# Patient Record
(demographics unavailable — no encounter records)

---

## 2025-03-14 NOTE — PHYSICAL EXAM
[de-identified] :  General: No acute distress, conversant, well-nourished. Head: Normocephalic, atraumatic Neck: trachea midline, FROM Heart: normotensive and normal rate and rhythm Lungs: No labored breathing Skin: No abrasions, no rashes, no edema Psych: Alert and oriented to person, place and time Extremities: no peripheral edema or digital cyanosis Gait: Normal gait. Can perform tandem gait.  Vascular: warm and well perfused distally, palpable distal pulses   MSK: Spine: + tenderness to palpation.  No step-off, no deformity.   NEURO: Sensation          Left           C5     2/2               C6     2/2               C7     2/2               C8     2/2              T1     2/2                        Right         C5     2/2               C6     2/2               C7     2/2               C8     2/2              T1     2/2        Motor:                                                Left             C5 (deltoid abduction)             5/5               C6 (biceps flexion)                   5/5                C7 (triceps extension)             5/5               C8 (finger flexion)                     5/5               T1 (interosseous)                     5/5                                                            Right           C5 (deltoid abduction)             5/5               C6 (biceps flexion)                   5/5                C7 (triceps extension)             5/5               C8 (finger flexion)                     5/5               T1 (interosseous)                     5/5                       Sensation Left L2  -  2/2            Left L3  -  2/2 Left L4  -  2/2 Left L5  -  2/2 Left S1  -  2/2   Right L2  -  2/2            Right L3  -  2/2 Right L4  -  2/2 Right L5  -  2/2 Right S1  -  2/2   Motor: Left L2 (hip flexion)                            5/5                Left L3 (knee extension)                   5/5                Left L4 (ankle dorsiflexion)                 5/5                Left L5 (long toe extensor)                5/5                Left S1 (ankle plantar flexion)           5/5   Right L2 (hip flexion)                            5/5                Right L3 (knee extension)                   5/5                Right L4 (ankle dorsiflexion)                 5/5                Right L5 (long toe extensor)                5/5                Right S1 (ankle plantar flexion)           5/5   Reflexes: Normal and symmetric Negative Spurlings test.  Negative Hoffmans reflex.  Negative clonus.  Down-going Babinski.  Knee with no erythema, no effusion.  + tenderness to palpation of knee. No medial joint line tenderness. No lateral joint line tenderness.   Range of motion: 0 - 130 degrees + pain with range of motion.   Negative Godfrey's test. Stable to varus and valgus stress. Negative Lachman's test, negative anterior and posterior drawer tests.    Sensation intact to light touch.  Normal motor exam. Warm and well perfused distally.   [de-identified] : Patient refused imaging today.

## 2025-03-14 NOTE — HISTORY OF PRESENT ILLNESS
[de-identified] : 68 y/o female presenting with upper and lower back pain x 3-4 months. She also complains of bilateral knee pain that began after EMG/NCS. She is unsure why she had a nerve test. She states the pain is 10/10. She's tried OTC pain medications without relief.  denies radicular pain, recent illness, fevers, numbness, weakness, balance problems, saddle anesthesia, urinary retention or fecal incontinence.

## 2025-03-14 NOTE — ASSESSMENT
[FreeTextEntry1] : 66 y/o female presenting with upper and lower back pain x 3-4 months. She also complains of bilateral knee pain that began after EMG/NCS. She is unsure why she had a nerve test. She states the pain is 10/10. She's tried OTC pain medications without relief.  denies radicular pain, recent illness, fevers, numbness, weakness, balance problems, saddle anesthesia, urinary retention or fecal incontinence.  Patient will be sent for a MRI T/L spine. Start diclofenac PRN. F/U after MRIs. We discussed red flag symptoms that would require emergent evaluation. She knows to call with any questions or concerns or if her symptoms acutely worsen.

## 2025-03-14 NOTE — HISTORY OF PRESENT ILLNESS
[de-identified] : 68 y/o female presenting with upper and lower back pain x 3-4 months. She also complains of bilateral knee pain that began after EMG/NCS. She is unsure why she had a nerve test. She states the pain is 10/10. She's tried OTC pain medications without relief.  denies radicular pain, recent illness, fevers, numbness, weakness, balance problems, saddle anesthesia, urinary retention or fecal incontinence.

## 2025-03-14 NOTE — PHYSICAL EXAM
[de-identified] :  General: No acute distress, conversant, well-nourished. Head: Normocephalic, atraumatic Neck: trachea midline, FROM Heart: normotensive and normal rate and rhythm Lungs: No labored breathing Skin: No abrasions, no rashes, no edema Psych: Alert and oriented to person, place and time Extremities: no peripheral edema or digital cyanosis Gait: Normal gait. Can perform tandem gait.  Vascular: warm and well perfused distally, palpable distal pulses   MSK: Spine: + tenderness to palpation.  No step-off, no deformity.   NEURO: Sensation          Left           C5     2/2               C6     2/2               C7     2/2               C8     2/2              T1     2/2                        Right         C5     2/2               C6     2/2               C7     2/2               C8     2/2              T1     2/2        Motor:                                                Left             C5 (deltoid abduction)             5/5               C6 (biceps flexion)                   5/5                C7 (triceps extension)             5/5               C8 (finger flexion)                     5/5               T1 (interosseous)                     5/5                                                            Right           C5 (deltoid abduction)             5/5               C6 (biceps flexion)                   5/5                C7 (triceps extension)             5/5               C8 (finger flexion)                     5/5               T1 (interosseous)                     5/5                       Sensation Left L2  -  2/2            Left L3  -  2/2 Left L4  -  2/2 Left L5  -  2/2 Left S1  -  2/2   Right L2  -  2/2            Right L3  -  2/2 Right L4  -  2/2 Right L5  -  2/2 Right S1  -  2/2   Motor: Left L2 (hip flexion)                            5/5                Left L3 (knee extension)                   5/5                Left L4 (ankle dorsiflexion)                 5/5                Left L5 (long toe extensor)                5/5                Left S1 (ankle plantar flexion)           5/5   Right L2 (hip flexion)                            5/5                Right L3 (knee extension)                   5/5                Right L4 (ankle dorsiflexion)                 5/5                Right L5 (long toe extensor)                5/5                Right S1 (ankle plantar flexion)           5/5   Reflexes: Normal and symmetric Negative Spurlings test.  Negative Hoffmans reflex.  Negative clonus.  Down-going Babinski.  Knee with no erythema, no effusion.  + tenderness to palpation of knee. No medial joint line tenderness. No lateral joint line tenderness.   Range of motion: 0 - 130 degrees + pain with range of motion.   Negative Godfrey's test. Stable to varus and valgus stress. Negative Lachman's test, negative anterior and posterior drawer tests.    Sensation intact to light touch.  Normal motor exam. Warm and well perfused distally.   [de-identified] : Patient refused imaging today.

## 2025-03-14 NOTE — ASSESSMENT
[FreeTextEntry1] : 68 y/o female presenting with upper and lower back pain x 3-4 months. She also complains of bilateral knee pain that began after EMG/NCS. She is unsure why she had a nerve test. She states the pain is 10/10. She's tried OTC pain medications without relief.  denies radicular pain, recent illness, fevers, numbness, weakness, balance problems, saddle anesthesia, urinary retention or fecal incontinence.  Patient will be sent for a MRI T/L spine. Start diclofenac PRN. F/U after MRIs. We discussed red flag symptoms that would require emergent evaluation. She knows to call with any questions or concerns or if her symptoms acutely worsen.

## 2025-03-14 NOTE — HISTORY OF PRESENT ILLNESS
[de-identified] : 66 y/o female presenting with upper and lower back pain x 3-4 months. She also complains of bilateral knee pain that began after EMG/NCS. She is unsure why she had a nerve test. She states the pain is 10/10. She's tried OTC pain medications without relief.  denies radicular pain, recent illness, fevers, numbness, weakness, balance problems, saddle anesthesia, urinary retention or fecal incontinence.

## 2025-03-14 NOTE — PHYSICAL EXAM
[de-identified] :  General: No acute distress, conversant, well-nourished. Head: Normocephalic, atraumatic Neck: trachea midline, FROM Heart: normotensive and normal rate and rhythm Lungs: No labored breathing Skin: No abrasions, no rashes, no edema Psych: Alert and oriented to person, place and time Extremities: no peripheral edema or digital cyanosis Gait: Normal gait. Can perform tandem gait.  Vascular: warm and well perfused distally, palpable distal pulses   MSK: Spine: + tenderness to palpation.  No step-off, no deformity.   NEURO: Sensation          Left           C5     2/2               C6     2/2               C7     2/2               C8     2/2              T1     2/2                        Right         C5     2/2               C6     2/2               C7     2/2               C8     2/2              T1     2/2        Motor:                                                Left             C5 (deltoid abduction)             5/5               C6 (biceps flexion)                   5/5                C7 (triceps extension)             5/5               C8 (finger flexion)                     5/5               T1 (interosseous)                     5/5                                                            Right           C5 (deltoid abduction)             5/5               C6 (biceps flexion)                   5/5                C7 (triceps extension)             5/5               C8 (finger flexion)                     5/5               T1 (interosseous)                     5/5                       Sensation Left L2  -  2/2            Left L3  -  2/2 Left L4  -  2/2 Left L5  -  2/2 Left S1  -  2/2   Right L2  -  2/2            Right L3  -  2/2 Right L4  -  2/2 Right L5  -  2/2 Right S1  -  2/2   Motor: Left L2 (hip flexion)                            5/5                Left L3 (knee extension)                   5/5                Left L4 (ankle dorsiflexion)                 5/5                Left L5 (long toe extensor)                5/5                Left S1 (ankle plantar flexion)           5/5   Right L2 (hip flexion)                            5/5                Right L3 (knee extension)                   5/5                Right L4 (ankle dorsiflexion)                 5/5                Right L5 (long toe extensor)                5/5                Right S1 (ankle plantar flexion)           5/5   Reflexes: Normal and symmetric Negative Spurlings test.  Negative Hoffmans reflex.  Negative clonus.  Down-going Babinski.  Knee with no erythema, no effusion.  + tenderness to palpation of knee. No medial joint line tenderness. No lateral joint line tenderness.   Range of motion: 0 - 130 degrees + pain with range of motion.   Negative Godfrey's test. Stable to varus and valgus stress. Negative Lachman's test, negative anterior and posterior drawer tests.    Sensation intact to light touch.  Normal motor exam. Warm and well perfused distally.   [de-identified] : Patient refused imaging today.

## 2025-04-01 NOTE — ASSESSMENT
[FreeTextEntry1] : Discussed at length with patient underlying arthritis in severity at this time patient elects home exercise and observation ultimately patient is aware if no improvement she may require total knee replacement

## 2025-04-01 NOTE — HISTORY OF PRESENT ILLNESS
[de-identified] : Initial visit: Bilateral knee pain  - Right worse than Left  Reason: No injury  Duration: 4 months  Prior studies:  No x rays - Thyroid surgery / MRI only Symptoms: sharp  Aggravating Fx: Walking / up and down stairs  Alleviating Fx:  Pain level:  810 Pain medication: none Medical Hx: Surgical Hx: Thyroid  - 10 years ago  Current Medication: Allergies: Penicillin

## 2025-04-01 NOTE — HISTORY OF PRESENT ILLNESS
[de-identified] : Initial visit: Bilateral knee pain  - Right worse than Left  Reason: No injury  Duration: 4 months  Prior studies:  No x rays - Thyroid surgery / MRI only Symptoms: sharp  Aggravating Fx: Walking / up and down stairs  Alleviating Fx:  Pain level:  810 Pain medication: none Medical Hx: Surgical Hx: Thyroid  - 10 years ago  Current Medication: Allergies: Penicillin

## 2025-04-01 NOTE — PHYSICAL EXAM
[de-identified] : Bilateral knee  Constitutional:  The patient is healthy-appearing and in no apparent distress.   Gait: The patient ambulates with a normal gait and no limp.  Cardiovascular System:  The capillary refill is less than 2 seconds.   Skin:  There are no skin abnormalities.  Right Knee:   Bony Palpation:  There is tenderness of the medial joint line.  There is no tenderness of the lateral joint line. There is tenderness of the medial femoral chondyle. There is no tenderness of the lateral femoral chondyle. There is no tenderness of the tibial tubercle. There is no tenderness of the superior patella. There is no tenderness of the inferior patella. There is tenderness of the medial patellar facet. There is tenderness of the lateral patellar facet.  Soft Tissue Palpation:  There is no tenderness of the medial retinaculum. There is no tenderness of the lateral retinaculum. There is no tenderness of the quadriceps tendon. There is no tenderness of the patella tendon. There is no tenderness of the ITB. There is no tenderness of the pes anserine.  Active Range of Motion:  The range of motion at the knee actively and passively is full.   Special Tests:  There is a negative Apley. There is a negative Steinmanns.  There is a negative Lachman and Anterior Drawer. There is a negative Posterior Drawer.   There is no varus or valgus laxity.  Strength:  There is 5/5 hip flexion and 5/5 knee flexion and extension.    Left Knee:   Bony Palpation:  There is tenderness of the medial joint line.  There is no tenderness of the lateral joint line. There is tenderness of the medial femoral chondyle. There is no tenderness of the lateral femoral chondyle. There is no tenderness of the tibial tubercle. There is no tenderness of the superior patella. There is no tenderness of the inferior patella. There is tenderness of the medial patellar facet. There is tenderness of the lateral patellar facet.  Soft Tissue Palpation:  There is no tenderness of the medial retinaculum. There is no tenderness of the lateral retinaculum. There is no tenderness of the quadriceps tendon. There is no tenderness of the patella tendon. There is no tenderness of the ITB. There is no tenderness of the pes anserine.  Active Range of Motion:  The range of motion at the knee actively and passively is full.   Special Tests:  There is a negative Apley. There is a negative Steinmanns.  There is a negative Lachman and Anterior Drawer. There is a negative Posterior Drawer.   There is no varus or valgus laxity.  Strength:  There is 5/5 hip flexion and 5/5 knee flexion and extension.    Psychiatric:  The patient demonstrates a normal mood and affect and is active and alert  [de-identified] : X-ray bilateral knee from 4 months ago per patient [place]: I reviewed these images and there is moderate medial and patellofemoral arthritis

## 2025-04-03 NOTE — PHYSICAL EXAM
[de-identified] : Bilateral hip  Constitutional:  The patient is healthy-appearing and in no apparent distress.   Gait: The patient ambulates with a normal gait and no limp.  Cardiovascular System:  There is capillary refill less than 2 seconds.   Skin:  There is no skin abnormalities.  Bilateral hip  Bony Palpation:  There is no tenderness of the iliac crest. There is no tenderness of the ASIS. There is no tenderness of the PSIS. There is no tenderness of the SI joint. There is tenderness of the greater trochanter.   Soft Tissue Palpation:  There is no tenderness of the hip adductors. There is no tenderness of the hip abductors. There is no tenderness of the hamstrings.  There is no tenderness of the piriformis.  There is no tenderness of the hip flexors.  Active Range of Motion:  There is full range of motion at the hip actively and passively.   Special Tests:  There is a positive Sara's test. There is a negative Hemanth-Yony test.   Strength:  There is 5/5 hip flexion, adduction, abduction.    Psychiatric:  The patient demonstrates a normal mood and affect and is active and alert. [de-identified] : Based on detailed discussion with history and physical examination of the patient, x-ray evaluation is recommended and ordered for treatment and diagnosis. X-ray bilateral hip 3 view: There is no significant bony / soft tissue abnormality, arthritis, or fracture.

## 2025-04-03 NOTE — HISTORY OF PRESENT ILLNESS
[de-identified] : New issue Bilateral hip  Prior studies: x rays  Symptoms:  radiating pain from back to buttocks  Pain Level:  unsure  Physical therapy: none

## 2025-04-03 NOTE — ASSESSMENT
[FreeTextEntry1] : Discussed at length with patient exam history and imaging consistent with greater trochanteric bursitis she elects home exercise PT also given home exercises for plantar fasciitis she will follow-up with Dr. Porter in regards to her MRIs of her lumbar spine and thoracic spine which reveal spondylolisthesis as well as arthritis

## 2025-04-09 NOTE — ASSESSMENT
[FreeTextEntry1] : 68 y/o female presenting with upper and lower back pain x 3-4 months. She also complains of bilateral knee pain that began after EMG/NCS. She is unsure why she had a nerve test. She states the pain is 10/10. She's tried OTC pain medications without relief. denies radicular pain, recent illness, fevers, numbness, weakness, balance problems, saddle anesthesia, urinary retention or fecal incontinence. Since previous visit, patient continues to have symptoms. She is here to review her lumbar and thoracic MRIs. I independently reviewed thoracic and lumbar MRIs which show degenerative changes without compression of neural elements. We discussed treatment options including PT, medications, and injections. The patient was given a referral for physical therapy. Diclofenac and/or tylenol PRN. The patient was given a referral for consideration for spinal injections. F/U in 6-8 weeks. We discussed red flag symptoms that would require emergent evaluation. She knows to call with any questions or concerns or if her symptoms acutely worsen.

## 2025-04-09 NOTE — DISCUSSION/SUMMARY
[de-identified] :  I, Dr. Porter personally performed the evaluation and management services for this established patient who presents today with (a) new problem(s)/exacerbation of (an) existing condition(s). That E/M includes conducting the clinically appropriate interval history &/or exam, assessing all new/exacerbated conditions, and establishing a new plan of care. Today, my MESERET, Melody Astudillo was here to observe my evaluation and management service for this new problem/exacerbated condition and follow the plan of care established by me going forward.

## 2025-04-09 NOTE — DISCUSSION/SUMMARY
[de-identified] :  I, Dr. Porter personally performed the evaluation and management services for this established patient who presents today with (a) new problem(s)/exacerbation of (an) existing condition(s). That E/M includes conducting the clinically appropriate interval history &/or exam, assessing all new/exacerbated conditions, and establishing a new plan of care. Today, my MESERET, Melody Astudillo was here to observe my evaluation and management service for this new problem/exacerbated condition and follow the plan of care established by me going forward.

## 2025-04-09 NOTE — PHYSICAL EXAM
[de-identified] : General: No acute distress, conversant, well-nourished. Head: Normocephalic, atraumatic Neck: trachea midline, FROM Heart: normotensive and normal rate and rhythm Lungs: No labored breathing Skin: No abrasions, no rashes, no edema Psych: Alert and oriented to person, place and time Extremities: no peripheral edema or digital cyanosis Gait: Normal gait. Can perform tandem gait. Vascular: warm and well perfused distally, palpable distal pulses  MSK: Spine: + tenderness to palpation. No step-off, no deformity.  NEURO: Sensation  Left  C5 2/2  C6 2/2  C7 2/2  C8 2/2  T1 2/2    Right C5 2/2  C6 2/2  C7 2/2  C8 2/2  T1 2/2  Motor:       Left  C5 (deltoid abduction)  5/5  C6 (biceps flexion)   5/5  C7 (triceps extension)  5/5  C8 (finger flexion)   5/5  T1 (interosseous)   5/5         Right  C5 (deltoid abduction)  5/5  C6 (biceps flexion)   5/5  C7 (triceps extension)  5/5  C8 (finger flexion)   5/5  T1 (interosseous)   5/5    Sensation Left L2 - 2/2  Left L3 - 2/2 Left L4 - 2/2 Left L5 - 2/2 Left S1 - 2/2  Right L2 - 2/2  Right L3 - 2/2 Right L4 - 2/2 Right L5 - 2/2 Right S1 - 2/2  Motor: Left L2 (hip flexion)    5/5  Left L3 (knee extension)   5/5  Left L4 (ankle dorsiflexion)   5/5  Left L5 (long toe extensor)  5/5  Left S1 (ankle plantar flexion)  5/5  Right L2 (hip flexion)    5/5  Right L3 (knee extension)   5/5  Right L4 (ankle dorsiflexion)   5/5  Right L5 (long toe extensor)  5/5  Right S1 (ankle plantar flexion)  5/5  Reflexes: Normal and symmetric Negative Spurlings test. Negative Hoffmans reflex. Negative clonus. Down-going Babinski. [de-identified] : I independently reviewed thoracic and lumbar MRIs which show degenerative changes without compression of neural elements.   MRI of the lumbar spine  03/28/2025  Multilevel degenerative changes of the lumbar spine, as detailed.   MRI of the thoracic spine 03/28/2025  Multilevel degenerative changes, as detailed.  Partially imaged right thyroid nodule(s). Suggest follow-up thyroid ultrasound, as clinically warranted.

## 2025-04-09 NOTE — DISCUSSION/SUMMARY
[de-identified] :  I, Dr. Porter personally performed the evaluation and management services for this established patient who presents today with (a) new problem(s)/exacerbation of (an) existing condition(s). That E/M includes conducting the clinically appropriate interval history &/or exam, assessing all new/exacerbated conditions, and establishing a new plan of care. Today, my MESERET, Melody Astudillo was here to observe my evaluation and management service for this new problem/exacerbated condition and follow the plan of care established by me going forward.

## 2025-04-09 NOTE — HISTORY OF PRESENT ILLNESS
[de-identified] : 68 y/o female presenting with upper and lower back pain x 3-4 months. She also complains of bilateral knee pain that began after EMG/NCS. She is unsure why she had a nerve test. She states the pain is 10/10. She's tried OTC pain medications without relief. denies radicular pain, recent illness, fevers, numbness, weakness, balance problems, saddle anesthesia, urinary retention or fecal incontinence. Since previous visit, patient continues to have symptoms. She is here to review her lumbar and thoracic MRIs.

## 2025-04-09 NOTE — HISTORY OF PRESENT ILLNESS
[de-identified] : 68 y/o female presenting with upper and lower back pain x 3-4 months. She also complains of bilateral knee pain that began after EMG/NCS. She is unsure why she had a nerve test. She states the pain is 10/10. She's tried OTC pain medications without relief. denies radicular pain, recent illness, fevers, numbness, weakness, balance problems, saddle anesthesia, urinary retention or fecal incontinence. Since previous visit, patient continues to have symptoms. She is here to review her lumbar and thoracic MRIs.

## 2025-04-09 NOTE — PHYSICAL EXAM
[de-identified] : General: No acute distress, conversant, well-nourished. Head: Normocephalic, atraumatic Neck: trachea midline, FROM Heart: normotensive and normal rate and rhythm Lungs: No labored breathing Skin: No abrasions, no rashes, no edema Psych: Alert and oriented to person, place and time Extremities: no peripheral edema or digital cyanosis Gait: Normal gait. Can perform tandem gait. Vascular: warm and well perfused distally, palpable distal pulses  MSK: Spine: + tenderness to palpation. No step-off, no deformity.  NEURO: Sensation  Left  C5 2/2  C6 2/2  C7 2/2  C8 2/2  T1 2/2    Right C5 2/2  C6 2/2  C7 2/2  C8 2/2  T1 2/2  Motor:       Left  C5 (deltoid abduction)  5/5  C6 (biceps flexion)   5/5  C7 (triceps extension)  5/5  C8 (finger flexion)   5/5  T1 (interosseous)   5/5         Right  C5 (deltoid abduction)  5/5  C6 (biceps flexion)   5/5  C7 (triceps extension)  5/5  C8 (finger flexion)   5/5  T1 (interosseous)   5/5    Sensation Left L2 - 2/2  Left L3 - 2/2 Left L4 - 2/2 Left L5 - 2/2 Left S1 - 2/2  Right L2 - 2/2  Right L3 - 2/2 Right L4 - 2/2 Right L5 - 2/2 Right S1 - 2/2  Motor: Left L2 (hip flexion)    5/5  Left L3 (knee extension)   5/5  Left L4 (ankle dorsiflexion)   5/5  Left L5 (long toe extensor)  5/5  Left S1 (ankle plantar flexion)  5/5  Right L2 (hip flexion)    5/5  Right L3 (knee extension)   5/5  Right L4 (ankle dorsiflexion)   5/5  Right L5 (long toe extensor)  5/5  Right S1 (ankle plantar flexion)  5/5  Reflexes: Normal and symmetric Negative Spurlings test. Negative Hoffmans reflex. Negative clonus. Down-going Babinski. [de-identified] : I independently reviewed thoracic and lumbar MRIs which show degenerative changes without compression of neural elements.   MRI of the lumbar spine  03/28/2025  Multilevel degenerative changes of the lumbar spine, as detailed.   MRI of the thoracic spine 03/28/2025  Multilevel degenerative changes, as detailed.  Partially imaged right thyroid nodule(s). Suggest follow-up thyroid ultrasound, as clinically warranted.

## 2025-04-09 NOTE — PHYSICAL EXAM
[de-identified] : General: No acute distress, conversant, well-nourished. Head: Normocephalic, atraumatic Neck: trachea midline, FROM Heart: normotensive and normal rate and rhythm Lungs: No labored breathing Skin: No abrasions, no rashes, no edema Psych: Alert and oriented to person, place and time Extremities: no peripheral edema or digital cyanosis Gait: Normal gait. Can perform tandem gait. Vascular: warm and well perfused distally, palpable distal pulses  MSK: Spine: + tenderness to palpation. No step-off, no deformity.  NEURO: Sensation  Left  C5 2/2  C6 2/2  C7 2/2  C8 2/2  T1 2/2    Right C5 2/2  C6 2/2  C7 2/2  C8 2/2  T1 2/2  Motor:       Left  C5 (deltoid abduction)  5/5  C6 (biceps flexion)   5/5  C7 (triceps extension)  5/5  C8 (finger flexion)   5/5  T1 (interosseous)   5/5         Right  C5 (deltoid abduction)  5/5  C6 (biceps flexion)   5/5  C7 (triceps extension)  5/5  C8 (finger flexion)   5/5  T1 (interosseous)   5/5    Sensation Left L2 - 2/2  Left L3 - 2/2 Left L4 - 2/2 Left L5 - 2/2 Left S1 - 2/2  Right L2 - 2/2  Right L3 - 2/2 Right L4 - 2/2 Right L5 - 2/2 Right S1 - 2/2  Motor: Left L2 (hip flexion)    5/5  Left L3 (knee extension)   5/5  Left L4 (ankle dorsiflexion)   5/5  Left L5 (long toe extensor)  5/5  Left S1 (ankle plantar flexion)  5/5  Right L2 (hip flexion)    5/5  Right L3 (knee extension)   5/5  Right L4 (ankle dorsiflexion)   5/5  Right L5 (long toe extensor)  5/5  Right S1 (ankle plantar flexion)  5/5  Reflexes: Normal and symmetric Negative Spurlings test. Negative Hoffmans reflex. Negative clonus. Down-going Babinski. [de-identified] : I independently reviewed thoracic and lumbar MRIs which show degenerative changes without compression of neural elements.   MRI of the lumbar spine  03/28/2025  Multilevel degenerative changes of the lumbar spine, as detailed.   MRI of the thoracic spine 03/28/2025  Multilevel degenerative changes, as detailed.  Partially imaged right thyroid nodule(s). Suggest follow-up thyroid ultrasound, as clinically warranted.

## 2025-04-09 NOTE — HISTORY OF PRESENT ILLNESS
[de-identified] : 66 y/o female presenting with upper and lower back pain x 3-4 months. She also complains of bilateral knee pain that began after EMG/NCS. She is unsure why she had a nerve test. She states the pain is 10/10. She's tried OTC pain medications without relief. denies radicular pain, recent illness, fevers, numbness, weakness, balance problems, saddle anesthesia, urinary retention or fecal incontinence. Since previous visit, patient continues to have symptoms. She is here to review her lumbar and thoracic MRIs.

## 2025-04-09 NOTE — ASSESSMENT
[FreeTextEntry1] : 66 y/o female presenting with upper and lower back pain x 3-4 months. She also complains of bilateral knee pain that began after EMG/NCS. She is unsure why she had a nerve test. She states the pain is 10/10. She's tried OTC pain medications without relief. denies radicular pain, recent illness, fevers, numbness, weakness, balance problems, saddle anesthesia, urinary retention or fecal incontinence. Since previous visit, patient continues to have symptoms. She is here to review her lumbar and thoracic MRIs. I independently reviewed thoracic and lumbar MRIs which show degenerative changes without compression of neural elements. We discussed treatment options including PT, medications, and injections. The patient was given a referral for physical therapy. Diclofenac and/or tylenol PRN. The patient was given a referral for consideration for spinal injections. F/U in 6-8 weeks. We discussed red flag symptoms that would require emergent evaluation. She knows to call with any questions or concerns or if her symptoms acutely worsen.

## 2025-05-13 NOTE — PHYSICAL EXAM
[Alert] : alert [Normal Voice/Communication] : normal voice/communication [Healthy Appearing] : healthy appearing [No Acute Distress] : no acute distress [Hearing Threshold Finger Rub Not [place]] : hearing was normal [Normal Appearance] : the appearance of the neck was normal [No Respiratory Distress] : no respiratory distress [No Masses] : no abdominal mass palpated [Abdomen Soft] : soft [Abnormal Walk] : normal gait [Normal Color / Pigmentation] : normal skin color and pigmentation [Oriented To Time, Place, And Person] : oriented to person, place, and time [Other: ___] : [unfilled]

## 2025-05-13 NOTE — ASSESSMENT
[FreeTextEntry1] : 68 y/o F with PMH of GERD, hypothyroidism, chronic back pain presents post-hospital f/u for sigmoid diverticulitis and colonic/sigmoid colitis found on CT A/P 5/3/25. C/o generalized abdominal pain and longstanding reflux s/s.    #Diverticulitis/#Colitis/#Generalized Abdominal Pain/#Colon CA Screening/#Acid Reflux -Schedule patient for EGD/colonoscopy at [facility] with Golytely prep in 6 weeks -Discussed R/A/I/B with patient. Education provided on preparation instructions and the need for an escort -Refer pt to Dr. Resendiz (colorectal surgeon) placed and referred via [facility] -Pt instructed she can progress diet to normal diet -Recommended to avoid Physical Therapy for the next 3-4 weeks -Recent labs drawn at PCP office 2 days ago: will request records  #Vaginal Pain -Ordered fluconazole for suspected yeast infection from recent abx -Referral placed for gynecologist and referred via [facility], informed pt to f/u for s/s of vaginal pain  Pt requests referral for Allergist: referral placed and referral sent via [facility] F/u OV in 2 weeks  IMary NP, am scribing for and in the presence of Dr. Jose Hopkins the following sections: history of present illness, past medical/family/social history; review of systems; vital signs; physical exam; disposition.  Jose PAL MD, personally performed the services described in the documentation, reviewed the documentation recorded by the scribe in my presence and it accurately and completely records my words and actions.

## 2025-05-13 NOTE — HISTORY OF PRESENT ILLNESS
[FreeTextEntry1] : denies [de-identified] : 5/3/25:CT A/P Impression: -acute sigmoid diverticulitis w/ likely concurrent descending colonic/sigmoid colitis -no evidence of perforation or abscess formation -normal appendix